# Patient Record
Sex: FEMALE | URBAN - METROPOLITAN AREA
[De-identification: names, ages, dates, MRNs, and addresses within clinical notes are randomized per-mention and may not be internally consistent; named-entity substitution may affect disease eponyms.]

---

## 2020-08-03 ENCOUNTER — EMERGENCY (EMERGENCY)
Facility: HOSPITAL | Age: 60
LOS: 1 days | Discharge: HOME | End: 2020-08-03
Attending: EMERGENCY MEDICINE | Admitting: EMERGENCY MEDICINE
Payer: MEDICARE

## 2020-08-03 VITALS
OXYGEN SATURATION: 100 % | HEART RATE: 62 BPM | SYSTOLIC BLOOD PRESSURE: 125 MMHG | TEMPERATURE: 98 F | DIASTOLIC BLOOD PRESSURE: 93 MMHG | RESPIRATION RATE: 18 BRPM

## 2020-08-03 LAB
ALBUMIN SERPL ELPH-MCNC: 4.2 G/DL — SIGNIFICANT CHANGE UP (ref 3.3–5)
ALP SERPL-CCNC: 66 U/L — SIGNIFICANT CHANGE UP (ref 40–120)
ALT FLD-CCNC: 18 U/L — SIGNIFICANT CHANGE UP (ref 4–33)
ANION GAP SERPL CALC-SCNC: 12 MMO/L — SIGNIFICANT CHANGE UP (ref 7–14)
AST SERPL-CCNC: 15 U/L — SIGNIFICANT CHANGE UP (ref 4–32)
BASOPHILS # BLD AUTO: 0.02 K/UL — SIGNIFICANT CHANGE UP (ref 0–0.2)
BASOPHILS NFR BLD AUTO: 0.4 % — SIGNIFICANT CHANGE UP (ref 0–2)
BILIRUB SERPL-MCNC: < 0.2 MG/DL — LOW (ref 0.2–1.2)
BUN SERPL-MCNC: 12 MG/DL — SIGNIFICANT CHANGE UP (ref 7–23)
CALCIUM SERPL-MCNC: 8.8 MG/DL — SIGNIFICANT CHANGE UP (ref 8.4–10.5)
CHLORIDE SERPL-SCNC: 105 MMOL/L — SIGNIFICANT CHANGE UP (ref 98–107)
CO2 SERPL-SCNC: 25 MMOL/L — SIGNIFICANT CHANGE UP (ref 22–31)
CREAT SERPL-MCNC: 0.7 MG/DL — SIGNIFICANT CHANGE UP (ref 0.5–1.3)
EOSINOPHIL # BLD AUTO: 0.14 K/UL — SIGNIFICANT CHANGE UP (ref 0–0.5)
EOSINOPHIL NFR BLD AUTO: 2.8 % — SIGNIFICANT CHANGE UP (ref 0–6)
GLUCOSE SERPL-MCNC: 92 MG/DL — SIGNIFICANT CHANGE UP (ref 70–99)
HCT VFR BLD CALC: 33.5 % — LOW (ref 34.5–45)
HGB BLD-MCNC: 10.7 G/DL — LOW (ref 11.5–15.5)
IMM GRANULOCYTES NFR BLD AUTO: 0.2 % — SIGNIFICANT CHANGE UP (ref 0–1.5)
LYMPHOCYTES # BLD AUTO: 1.73 K/UL — SIGNIFICANT CHANGE UP (ref 1–3.3)
LYMPHOCYTES # BLD AUTO: 34.9 % — SIGNIFICANT CHANGE UP (ref 13–44)
MCHC RBC-ENTMCNC: 29.7 PG — SIGNIFICANT CHANGE UP (ref 27–34)
MCHC RBC-ENTMCNC: 31.9 % — LOW (ref 32–36)
MCV RBC AUTO: 93.1 FL — SIGNIFICANT CHANGE UP (ref 80–100)
MONOCYTES # BLD AUTO: 0.26 K/UL — SIGNIFICANT CHANGE UP (ref 0–0.9)
MONOCYTES NFR BLD AUTO: 5.3 % — SIGNIFICANT CHANGE UP (ref 2–14)
NEUTROPHILS # BLD AUTO: 2.79 K/UL — SIGNIFICANT CHANGE UP (ref 1.8–7.4)
NEUTROPHILS NFR BLD AUTO: 56.4 % — SIGNIFICANT CHANGE UP (ref 43–77)
NRBC # FLD: 0 K/UL — SIGNIFICANT CHANGE UP (ref 0–0)
PLATELET # BLD AUTO: 195 K/UL — SIGNIFICANT CHANGE UP (ref 150–400)
PMV BLD: 12.5 FL — SIGNIFICANT CHANGE UP (ref 7–13)
POTASSIUM SERPL-MCNC: 3.6 MMOL/L — SIGNIFICANT CHANGE UP (ref 3.5–5.3)
POTASSIUM SERPL-SCNC: 3.6 MMOL/L — SIGNIFICANT CHANGE UP (ref 3.5–5.3)
PROT SERPL-MCNC: 6.6 G/DL — SIGNIFICANT CHANGE UP (ref 6–8.3)
RBC # BLD: 3.6 M/UL — LOW (ref 3.8–5.2)
RBC # FLD: 13.7 % — SIGNIFICANT CHANGE UP (ref 10.3–14.5)
SODIUM SERPL-SCNC: 142 MMOL/L — SIGNIFICANT CHANGE UP (ref 135–145)
WBC # BLD: 4.95 K/UL — SIGNIFICANT CHANGE UP (ref 3.8–10.5)
WBC # FLD AUTO: 4.95 K/UL — SIGNIFICANT CHANGE UP (ref 3.8–10.5)

## 2020-08-03 PROCEDURE — 99284 EMERGENCY DEPT VISIT MOD MDM: CPT

## 2020-08-03 PROCEDURE — 70498 CT ANGIOGRAPHY NECK: CPT | Mod: 26

## 2020-08-03 PROCEDURE — 70496 CT ANGIOGRAPHY HEAD: CPT | Mod: 26

## 2020-08-03 RX ORDER — METOCLOPRAMIDE HCL 10 MG
10 TABLET ORAL ONCE
Refills: 0 | Status: COMPLETED | OUTPATIENT
Start: 2020-08-03 | End: 2020-08-03

## 2020-08-03 RX ORDER — KETOROLAC TROMETHAMINE 30 MG/ML
15 SYRINGE (ML) INJECTION ONCE
Refills: 0 | Status: DISCONTINUED | OUTPATIENT
Start: 2020-08-03 | End: 2020-08-03

## 2020-08-03 RX ORDER — SODIUM CHLORIDE 9 MG/ML
1000 INJECTION INTRAMUSCULAR; INTRAVENOUS; SUBCUTANEOUS ONCE
Refills: 0 | Status: COMPLETED | OUTPATIENT
Start: 2020-08-03 | End: 2020-08-03

## 2020-08-03 RX ADMIN — Medication 15 MILLIGRAM(S): at 18:18

## 2020-08-03 RX ADMIN — Medication 10 MILLIGRAM(S): at 18:18

## 2020-08-03 RX ADMIN — SODIUM CHLORIDE 1000 MILLILITER(S): 9 INJECTION INTRAMUSCULAR; INTRAVENOUS; SUBCUTANEOUS at 18:18

## 2020-08-03 NOTE — ED PROVIDER NOTE - PATIENT PORTAL LINK FT
You can access the FollowMyHealth Patient Portal offered by [facility] by registering at the following website: [email or web address]. By joining [employer]’s FollowMyHealth portal, you will also be able to view your health information using other applications (apps) compatible with our system.

## 2020-08-03 NOTE — CONSULT NOTE ADULT - SUBJECTIVE AND OBJECTIVE BOX
HPI:      PAST MEDICAL & SURGICAL HISTORY:  No pertinent past medical history    FAMILY HISTORY:       General:  Constitutional: Obese Female, appears stated age, in no apparent distress including pain  Head: Normocephalic & atraumatic.  ENT: Patent ear canals, intact TM, mucus membranes moist & pink, neck supple, no lymphadenopathy.   Respiratory: Patent airway. All lung fields are clear to auscultation bilaterally.  Extremities: No cyanosis, clubbing, or edema.  Skin: No rashes, bruising, or discoloration.    Cardiovascular (>2): RRR no murmurs. Carotid pulsations symmetric, no bruits. Normal capillary beds refill, 1-2 seconds or less.     Neurological (>12):  MS: Awake, alert, oriented to person, place, situation, time. Normal affect. Follows all commands.    Language: Speech is clear, fluent with good repetition & comprehension (able to name objects___)    CNs: PERRLA (R = 3mm, L = 3mm). VFF. EOMI no nystagmus, no diplopia. V1-3 intact to LT/pinprick, well developed masseter muscles b/l. No facial asymmetry b/l, full eye closure strength b/l. Hearing grossly normal (rubbing fingers) b/l. Symmetric palate elevation in midline. Gag reflex deferred. Head turning & shoulder shrug intact b/l. Tongue midline, normal movements, no atrophy.    Fundoscopic: pale w/ sharp discs margins No vascular changes.      Motor: Normal muscle bulk & tone. No noticeable tremor or seizure. No pronator drift.              Deltoid	Biceps	Triceps	Wrist	Finger ABd	   R	5	5	5	5	5		5 	  L	5	5	5	5	5		5    	H-Flex	H-Ext	H-ABd	H-ADd	K-Flex	K-Ext	D-Flex	P-Flex  R	5	5	5	5	5	5	5	5 	   L	5	5	5	5	5	5	5	5	     Sensation: Intact to LT/PP/Temp/Vibration/Position b/l throughout.     Cortical: Extinction on DSS (neglect): none    Reflexes:              Biceps(C5)       BR(C6)     Triceps(C7)               Patellar(L4)    Achilles(S1)    Plantar Resp  R	2	          2	             2		        2		    2		Down   L	2	          2	             2		        2		    2		Down     Coordination: intact rapid-alt movements. No dysmetria to FTN/HTS    Gait: Normal Romberg. No postural instability. Normal stance and tandem gait.     LABORATORY:  CBC                       10.7   4.95  )-----------( 195      ( 03 Aug 2020 18:14 )             33.5     Chem 08-03    142  |  105  |  12  ----------------------------<  92  3.6   |  25  |  0.70    Ca    8.8      03 Aug 2020 18:14    TPro  6.6  /  Alb  4.2  /  TBili  < 0.2<L>  /  DBili  x   /  AST  15  /  ALT  18  /  AlkPhos  66  08-03    LFTs LIVER FUNCTIONS - ( 03 Aug 2020 18:14 )  Alb: 4.2 g/dL / Pro: 6.6 g/dL / ALK PHOS: 66 u/L / ALT: 18 u/L / AST: 15 u/L / GGT: x           Coagulopathy   Lipid Panel   A1c   Cardiac enzymes     U/A HPI:  Patient is a 60 year old female who presents with CC of dizziness which began on day of presentation, 8/3/20. She has PMHx of syringomyelia, ?thickened uterus (getting MRI pelvis in several days), and anemia. She woke up on AM of presentation and felt off balance. Symptoms worsened after she stood up from the toilet, and felt like she was going to fall over, and had to grab the edge of the sink and then laid down, sx lasted for several hours. She went to an urgent care who sent her to the ED for eval. She denies current dizziness but did have headache which resolved with toradol and reglan in ED.   CTh, CTA pending in ED. CBC, CMP unremarkable    General:  Constitutional: Female, appears stated age, in no apparent distress including pain  Head: Normocephalic & atraumatic.  Neurological (>12):  MS: Awake, alert, oriented to person, place, situation, time. Normal affect. Follows all commands.    Language: Speech is clear, fluent with good repetition & comprehension    CNs: PERRL. VFF. EOMI no nystagmus, no diplopia. V1-3 intact to LT No facial asymmetry b/l, full eye closure strength b/l. Hearing grossly normal (rubbing fingers) b/l. Head turning & shoulder shrug intact b/l. Tongue midline    Motor: Normal muscle bulk & tone. No noticeable tremor or seizure. No pronator drift.              Deltoid	Biceps	Triceps	   R	5	5	5	5  L	5	5	5	5	    	H-Flex	H-Ext	K-Flex	K-Ext	D-Flex	P-Flex  R	5	5	5	5	5	5	   L	5	5	5	5	5	5	     Sensation: Intact to LT b/l throughout.   Cortical: Extinction on DSS (neglect): none    Reflexes:              Biceps(C5)       BR(C6)     Triceps(C7)               Patellar(L4)    Achilles(S1)    Plantar Resp  R	2	          2	             2		        2		    2		Down   L	2	          2	             2		        2		    2		Down     Coordination: intact rapid-alt movements. No dysmetria to FTN/HTS  Gait: Normal Romberg. No postural instability. Normal stance and tandem gait.     LABORATORY:  CBC                       10.7   4.95  )-----------( 195      ( 03 Aug 2020 18:14 )             33.5     Chem 08-03    142  |  105  |  12  ----------------------------<  92  3.6   |  25  |  0.70    Ca    8.8      03 Aug 2020 18:14    TPro  6.6  /  Alb  4.2  /  TBili  < 0.2<L>  /  DBili  x   /  AST  15  /  ALT  18  /  AlkPhos  66  08-03    LFTs LIVER FUNCTIONS - ( 03 Aug 2020 18:14 )  Alb: 4.2 g/dL / Pro: 6.6 g/dL / ALK PHOS: 66 u/L / ALT: 18 u/L / AST: 15 u/L / GGT: x HPI:  Patient is a 60 year old female who presents with CC of dizziness which began on day of presentation, 8/3/20. She has PMHx of syringomyelia (does not recall name of neurologist), ?thickened uterus (getting MRI pelvis in several days), and anemia. She woke up on AM of presentation and felt off balance. Symptoms worsened after she stood up from the toilet, and felt like she was going to fall over, and had to grab the edge of the sink and then laid down, sx lasted for several hours. She described the dizziness as room spinning when she laid down, and as if she were "on a boat" while laying down. She went to an urgent care who sent her to the ED for eval. She denies current dizziness but did have headache which has mostly resolved with toradol and reglan in ED. This headache is unchanged from her prior headaches, occurs usually above her R eyebrow and in the middle of her forehead. She used to take percocet and ibuprofen for HA, however stopped ibuprofen due to LGIB.   CTh, CTA in ED unremarkable. CBC, CMP unremarkable  Neurology was consulted for dysmetria to FTN on LUE on exam.     General:  Constitutional: Female, appears stated age, in no apparent distress including pain  Head: Normocephalic & atraumatic.  Neurological (>12):  MS: Awake, alert, oriented to person, place, situation, time. Normal affect. Follows all commands.  Language: Speech is clear, fluent with good repetition & comprehension  CNs: PERRL. VFF. EOMI no nystagmus, no diplopia. V1-3 intact to LT No facial asymmetry b/l, full eye closure strength b/l. Hearing grossly normal (rubbing fingers) b/l. Head turning & shoulder shrug intact b/l. Tongue midline  Motor: Normal muscle bulk & tone. No noticeable tremor or seizure. No pronator drift.              Deltoid	Biceps	Triceps	   R	5	5	5	5  L	5	5	5	5	    	H-Flex	H-Ext	K-Flex	K-Ext	D-Flex	P-Flex  R	5	5	5	5	5	5	   L	5	5	5	5	5	5	     Sensation: Intact to LT on RUE, RLE, slightly diminished on LLE, LUE, however face is spared.   Cortical: Extinction on DSS (neglect): none  Coordination: intact rapid-alt movements. No dysmetria to FTN  Gait: Normal Romberg. No postural instability. Normal stance and gait.     LABORATORY:  CBC                       10.7   4.95  )-----------( 195      ( 03 Aug 2020 18:14 )             33.5     Chem 08-03    142  |  105  |  12  ----------------------------<  92  3.6   |  25  |  0.70    Ca    8.8      03 Aug 2020 18:14    TPro  6.6  /  Alb  4.2  /  TBili  < 0.2<L>  /  DBili  x   /  AST  15  /  ALT  18  /  AlkPhos  66  08-03    LFTs LIVER FUNCTIONS - ( 03 Aug 2020 18:14 )  Alb: 4.2 g/dL / Pro: 6.6 g/dL / ALK PHOS: 66 u/L / ALT: 18 u/L / AST: 15 u/L / GGT: x

## 2020-08-03 NOTE — ED PROVIDER NOTE - CLINICAL SUMMARY MEDICAL DECISION MAKING FREE TEXT BOX
59 y/o female c/o dizziness after standing from toilet bowl- low concern for posterior CVA as symptoms have resolved spontaneously. will check labs, ekg, CT head reassess

## 2020-08-03 NOTE — ED ADULT TRIAGE NOTE - CHIEF COMPLAINT QUOTE
states" I feel dizzy since last night weakness. " was having head ache since yesterday. h/o pelvic bacterial infection 10 days ago and treated with antibiotics and still has pelvic pain".

## 2020-08-03 NOTE — ED ADULT NURSE REASSESSMENT NOTE - NS ED NURSE REASSESS COMMENT FT1
pt resting in results waiting. Pt alert and oriented x4. c.o  7/10 pelvic pain, escalated to md for pain medication. pt waiting for reassessment

## 2020-08-03 NOTE — ED PROVIDER NOTE - ATTENDING CONTRIBUTION TO CARE
agree with PA note    "61 y/o female c/o dizziness x today. Pt admits to waking up this AM and felling off balance. Pt states symptoms started immediately after standing up form the toilet. Pt felt like she was going to fall over, grabbed the edge of the sink and stabilized, then laid back down in bed. Pt states that symptoms lasted several hours and she went to urgent care who then sent pt to ER chidi schroeder. Pt now feeling better, denies current dizziness but admits to generalized headache. Pt also admits to b/l lower abd pain x several months."  States US shows thickened endometrium.  Scheduled for pelvic MRI this Wednesday.  States awoke with dizziness.  not described as vertiginous.    PE: well appearing; VSS; not clinically orthostatic; EOMI; CTAB/L; s1 s2 no m/r/g abd soft/mild lower tenderness ext: no edema Neuro: CNs intact; 5/5 motor UE and LE; SUNIL wnl; FTN abnormal on left; gait stable    pt has hx of anemia, syringomyelia (no neuro symptoms); will check labs, IVF, given abnormal FTN will have neuro consult (finger goes straight and is linear but misses my finger), CTA head and neck

## 2020-08-03 NOTE — ED ADULT NURSE NOTE - NSIMPLEMENTINTERV_GEN_ALL_ED
Implemented All Universal Safety Interventions:  [place] to call system. Call bell, personal items and telephone within reach. Instruct patient to call for assistance. Room bathroom lighting operational. Non-slip footwear when patient is off stretcher. Physically safe environment: no spills, clutter or unnecessary equipment. Stretcher in lowest position, wheels locked, appropriate side rails in place.

## 2020-08-03 NOTE — CONSULT NOTE ADULT - ASSESSMENT
PENDED 60 year old female who presents with CC of dizziness which began on day of presentation, 8/3/20. She has PMHx of syringomyelia (does not recall name of neurologist), ?thickened uterus (getting MRI pelvis in several days), and anemia. She woke up on AM of presentation and felt off balance. Symptoms worsened after she stood up from the toilet, and felt like she was going to fall over, and had to grab the edge of the sink and then laid down, sx lasted for several hours. She described the dizziness as room spinning when she laid down, and as if she were "on a boat" while laying down. She went to an urgent care who sent her to the ED for eval. She denies current dizziness but did have headache which has mostly resolved with toradol and reglan in ED. This headache is unchanged from her prior headaches, occurs usually above her R eyebrow and in the middle of her forehead. She used to take percocet and ibuprofen for HA, however stopped ibuprofen due to LGIB. She denies ringing in ears, no sensation of fullness  CTh, CTA in ED unremarkable. CBC, CMP unremarkable  Neurology was consulted for dysmetria to FTN on LUE on exam.   On exam she no longer has FTN dysmetria, states that dizziness has resolved, she is able to ambulate without assistance, and her headache is resolving    Impression: likely peripheral vertigo, given negative imaging and resolution of symptoms. regarding LUE, LLE numbness, unclear chronicity of symptoms.     Plan  [] follow up with outpatient neurologist. If unable to follow up, can follow up with Dr Johann kitchen as outpt for MRI  [] continue current home medications  [] no further inpatient neurologic workup    To be d/w attending 60 year old female who presents with CC of dizziness which began on day of presentation, 8/3/20. She has PMHx of syringomyelia (does not recall name of neurologist), ?thickened uterus (getting MRI pelvis in several days), and anemia. She woke up on AM of presentation and felt off balance. Symptoms worsened after she stood up from the toilet, and felt like she was going to fall over, and had to grab the edge of the sink and then laid down, sx lasted for several hours. She described the dizziness as room spinning when she laid down, and as if she were "on a boat" while laying down. She went to an urgent care who sent her to the ED for eval. She denies current dizziness but did have headache which has mostly resolved with toradol and reglan in ED. This headache is unchanged from her prior headaches, occurs usually above her R eyebrow and in the middle of her forehead. She used to take percocet and ibuprofen for HA, however stopped ibuprofen due to LGIB. She denies ringing in ears, no sensation of fullness  CTh, CTA in ED unremarkable. CBC, CMP unremarkable  Neurology was consulted for dysmetria to FTN on LUE on exam.   On exam she no longer has FTN dysmetria, states that dizziness has resolved, she is able to ambulate without assistance, and her headache is resolving    Impression: likely pre-syncope, given negative imaging and resolution of symptoms. regarding LUE, LLE numbness, unclear chronicity of symptoms.     Plan  [] follow up with outpatient neurologist. If unable to follow up, can follow up with Dr Johann kitchen as outpt for MRI  [] continue current home medications  [] no further inpatient neurologic workup    To be d/w attending

## 2020-08-03 NOTE — ED PROVIDER NOTE - OBJECTIVE STATEMENT
59 y/o female c/o dizziness x today. Pt admits to waking up this AM and felling off balance. Pt states symptoms started immediately after standing up form the toilet. Pt felt like she was going to fall over, grabbed the edge of the sink and stabilized, then laid back down in bed. Pt states that symptoms lasted several hours and she went to urgent care who then sent pt to ER chidi schroeder. Pt now feeling better, denies current dizziness but admits to generalized headache. Pt also admits to b/l lower abd pain x several months. Pt had recent US showing "thickened uterus" and pt is scheduled for MRI in 2 days. Denies chest pain, sob, palpitations, n/v/d, dysuria, vaginal bleeding or discharge, numbness, tingling, weakness, neck pain, slurred speech, change in vision, fever or chills.

## 2020-08-03 NOTE — ED ADULT NURSE NOTE - OBJECTIVE STATEMENT
Pt A/Ox4 states she was sent in by urgent care to "rule out a stroke." Pt states she has been feeling dizzy and lightheaded and went to urgent care today and was told, "she didn't have all her faculties and didn't pass her neuro exam and needed an ambulance to take her to the ED as she was having a stroke." Pt tearful upon assessment states she feels very anxious and has a h/a +abd pain. Grzegorz AVALOS informed. PIV inserted with aseptic technique, blood drawn, labeled at bedside with 2 pt identifiers and sent to lab. Pt aware of POC, NAD. Medicated per order.

## 2020-08-03 NOTE — CONSULT NOTE ADULT - ATTENDING COMMENTS
Case discussed with resident overnight. Seems to be more likely pre-syncope, 2/2 hypovolemia. Pt can follow up in the office.

## 2020-09-16 PROBLEM — Z78.9 OTHER SPECIFIED HEALTH STATUS: Chronic | Status: ACTIVE | Noted: 2020-08-03

## 2022-05-02 NOTE — ED PROVIDER NOTE - CROS ED NEURO ALL NEG
Medication:    Outpatient Medications Marked as Taking for the 5/2/22 encounter (Refill) with JOSE RAMON Carson   Medication Sig Dispense Refill   • traZODone (DESYREL) 50 MG tablet Take one and one half tablets nightly 135 tablet 1   • desvenlafaxine (PRISTIQ) 100 MG 24 hr tablet Take 100 mg by mouth daily. 90 tablet 1   • lamoTRIgine (LaMICtal) 150 MG tablet Take one tablet daily in the evening 90 tablet 1   • prazosin (MINIPRESS) 2 MG capsule Take 1 capsule by mouth nightly. 30 capsule 3       Message to Prescriber: New pharmacy requesting    [x] Pharmacy has been verified.    [] Patient completely out of medication (*Route encounter as high priority if checked)    [] Patient informed refill request can take up to 5 business days to be processed    Patient currently has follow up appointment scheduled       - - -

## 2022-07-15 PROBLEM — Z00.00 ENCOUNTER FOR PREVENTIVE HEALTH EXAMINATION: Status: ACTIVE | Noted: 2022-07-15

## 2022-07-19 ENCOUNTER — APPOINTMENT (OUTPATIENT)
Dept: NEUROSURGERY | Facility: CLINIC | Age: 62
End: 2022-07-19

## 2022-08-08 ENCOUNTER — APPOINTMENT (OUTPATIENT)
Dept: NEUROSURGERY | Facility: CLINIC | Age: 62
End: 2022-08-08

## 2022-08-12 NOTE — HISTORY OF PRESENT ILLNESS
[de-identified] : Patient is a 62 year old female with history of headache and neck pain, left arm pain. Imaging studies at [facility] on 01/10/22.\par \par MRI brain w/o 1/10/22: No intracranial abnormality, stable microvascular disease\par MRI cervical spine w/o 1/10/22: Multilevel cervical spondylosis with mild to moderate canal and foraminal stenosis. Upper spinal cord syrinx extending from C1-C3. \par \par \par ****INCOMPLETE**** 8/12/22

## 2022-08-12 NOTE — ASSESSMENT
[FreeTextEntry1] : IMPRESSION:\par Patient is a 62 year old female with history of headache and neck pain, left arm pain. Imaging studies at [facility] on 01/10/22.\par \par MRI brain w/o 1/10/22: No intracranial abnormality, stable microvascular disease\par MRI cervical spine w/o 1/10/22: Multilevel cervical spondylosis with mild to moderate canal and foraminal stenosis. Upper spinal cord syrinx extending from C1-C3. \par \par ****INCOMPLETE**** 8/12/22\par \par \par \par \par PLAN:\par

## 2022-08-15 ENCOUNTER — APPOINTMENT (OUTPATIENT)
Dept: NEUROSURGERY | Facility: CLINIC | Age: 62
End: 2022-08-15

## 2022-08-19 ENCOUNTER — APPOINTMENT (OUTPATIENT)
Dept: NEUROSURGERY | Facility: CLINIC | Age: 62
End: 2022-08-19

## 2022-08-19 VITALS
OXYGEN SATURATION: 97 % | HEIGHT: 65 IN | BODY MASS INDEX: 28.32 KG/M2 | SYSTOLIC BLOOD PRESSURE: 117 MMHG | WEIGHT: 170 LBS | DIASTOLIC BLOOD PRESSURE: 72 MMHG | HEART RATE: 75 BPM | TEMPERATURE: 97.1 F

## 2022-08-19 DIAGNOSIS — Z78.9 OTHER SPECIFIED HEALTH STATUS: ICD-10-CM

## 2022-08-19 PROCEDURE — 99204 OFFICE O/P NEW MOD 45 MIN: CPT

## 2022-08-19 RX ORDER — IBUPROFEN 200 MG/1
TABLET, FILM COATED ORAL
Refills: 0 | Status: ACTIVE | COMMUNITY

## 2022-08-25 ENCOUNTER — APPOINTMENT (OUTPATIENT)
Dept: NEUROSURGERY | Facility: CLINIC | Age: 62
End: 2022-08-25

## 2022-08-27 NOTE — ASSESSMENT
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 Sanchez, personally performed the evaluation and management (E/M) services for this new patient.  That E/M includes conducting the initial examination, assessing all conditions, and establishing the plan of care.  Today, my ACP, Syeda Lepe, was here to observe my evaluation and management services for this patient to be followed going forward.\par \par

## 2022-08-27 NOTE — PHYSICAL EXAM
[General Appearance - Alert] : alert [General Appearance - In No Acute Distress] : in no acute distress [Oriented To Time, Place, And Person] : oriented to person, place, and time [Limited Balance] : the patient's balance was impaired [No Visual Abnormalities] : no visible abnormalities [Sclera] : the sclera and conjunctiva were normal [Outer Ear] : the ears and nose were normal in appearance [Neck Appearance] : the appearance of the neck was normal [] : no respiratory distress [Skin Color & Pigmentation] : normal skin color and pigmentation [FreeTextEntry6] : RIGHT arm and LEFT arm strength 4/5\par

## 2022-08-27 NOTE — HISTORY OF PRESENT ILLNESS
[de-identified] : \par 62 year old woman with no past medical history who presents today for neurosurgical evaluation of syrinx in the cervical spinal cord.\par \par She states she has known about syrinx for the past 20 years. It was initially found as an incidental finding when she had a lipoma on her neck removed. She states that over the past 20 years, she has experienced worsening radiating pain/numbness/tingling in bilateral upper extremities as well as imbalance. She uses a cane for assistance with walking. She also reports subjective weakness in bilateral hands and arms.\par \par MRI brain without contrast done on 1/10/22 demonstrates upper cervical spinal cord syrinx and right frontal scalp subgaleal lipoma.\par \par MRI cervical spine without contrast done on 1/10/22 demonstrates a decrease in the size of the syrinx in the upper cervical cord when compared to prior examination.

## 2022-08-27 NOTE — DATA REVIEWED
[de-identified] : MRI brain without contrast from 1/10/22 and MRI cervical spine without contrast from 1/10/22 reviewed

## 2023-03-27 ENCOUNTER — APPOINTMENT (OUTPATIENT)
Dept: NEUROSURGERY | Facility: CLINIC | Age: 63
End: 2023-03-27

## 2023-03-29 ENCOUNTER — APPOINTMENT (OUTPATIENT)
Dept: NEUROSURGERY | Facility: HOSPITAL | Age: 63
End: 2023-03-29

## 2023-03-30 ENCOUNTER — APPOINTMENT (OUTPATIENT)
Dept: NEUROSURGERY | Facility: CLINIC | Age: 63
End: 2023-03-30
Payer: MEDICARE

## 2023-03-30 VITALS
SYSTOLIC BLOOD PRESSURE: 120 MMHG | DIASTOLIC BLOOD PRESSURE: 78 MMHG | HEIGHT: 65 IN | TEMPERATURE: 95.5 F | OXYGEN SATURATION: 97 % | WEIGHT: 155 LBS | BODY MASS INDEX: 25.83 KG/M2 | RESPIRATION RATE: 17 BRPM | HEART RATE: 80 BPM

## 2023-03-30 DIAGNOSIS — G93.5 COMPRESSION OF BRAIN: ICD-10-CM

## 2023-03-30 DIAGNOSIS — G95.0 SYRINGOMYELIA AND SYRINGOBULBIA: ICD-10-CM

## 2023-03-30 PROCEDURE — 99215 OFFICE O/P EST HI 40 MIN: CPT

## 2023-03-30 NOTE — PHYSICAL EXAM
[General Appearance - Alert] : alert [General Appearance - In No Acute Distress] : in no acute distress [Oriented To Time, Place, And Person] : oriented to person, place, and time [Impaired Insight] : insight and judgment were intact [Affect] : the affect was normal [Cranial Nerves Optic (II)] : visual acuity intact bilaterally,  pupils equal round and reactive to light [Cranial Nerves Oculomotor (III)] : extraocular motion intact [Cranial Nerves Facial (VII)] : face symmetrical [Cranial Nerves Vestibulocochlear (VIII)] : hearing was intact bilaterally [Cranial Nerves Glossopharyngeal (IX)] : tongue and palate midline [Cranial Nerves Accessory (XI - Cranial And Spinal)] : head turning and shoulder shrug symmetric [Cranial Nerves Hypoglossal (XII)] : there was no tongue deviation with protrusion [Limited Balance] : the patient's balance was impaired [Neck Appearance] : the appearance of the neck was normal [] : no respiratory distress [Respiration, Rhythm And Depth] : normal respiratory rhythm and effort [FreeTextEntry5] : b/l arms 4/5 in strength, 4/5 b/l hand grasp [FreeTextEntry8] : proprioception limited at times when ambulating

## 2023-03-30 NOTE — DATA REVIEWED
[de-identified] : 3/28 MRI brain CSF flow study and MRI cervical from [facility]  independently reviewed by Dr. 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

## 2023-03-30 NOTE — ADDENDUM
[FreeTextEntry1] : Patient is myelopathic with loss of lower extremity proprioception, numbness in arms and legs, subjective weakness, loss of balance. New MRI brain and C-spine showing an approximately 6 mm Chiari malformation associated with cervical spinal cord syrinx. Her myelopathic symptoms are likely due in part to the syringomyelia which does result in neural element compression in the cervical spinal cord. I discussed with the patient that suboccipital decompression-C1 laminectomy is one way to address the radiographic findings in hopes of preventing progression of neurological symptoms. Risks including but not limited to CSF leak, infection, need for re-operation, failure to improve neurologically, worsening neurological function, death, anesthetic complication discussed. Patient understands and wishes to move forward with surgery.\par \par Bro Sanchez M.D.

## 2023-03-30 NOTE — REASON FOR VISIT
[FreeTextEntry1] : to review MRI brain CSF flow study and MRI cervical for hx cerebellar ectopia and cervical cord syrinx.

## 2023-03-30 NOTE — HISTORY OF PRESENT ILLNESS
[FreeTextEntry1] : 62 year old woman with no past medical history who presented in Aug 2022 for neurosurgical evaluation of syrinx in the cervical spinal cord.\par \par She states she has known about syrinx for the past 20 years. It was initially found as an incidental finding when she had a lipoma on her neck removed. She states that over the past 20 years, she has experienced worsening radiating pain/numbness/tingling in bilateral upper extremities as well as imbalance. She uses a cane for assistance with walking. She also reports subjective weakness in bilateral hands and arms.\par \par MRI brain without contrast done on 1/10/22 demonstrates upper cervical spinal cord syrinx and right frontal scalp subgaleal lipoma. MRI cervical spine without contrast done on 1/10/22 demonstrates a decrease in the size of the syrinx in the upper cervical cord when compared to prior examination. She was advised to get MRI brain CSF flow study to assess for obstruction.\par \par Today, she presents back with a new MRI brain and cervical spine done on 3/28/23. \par She reports worsening R leg numbness and of b/l hands, worsening imbalance. She reports "taking steps when there are no steps". She is only able to tolerate short periods of sitting and standing. \par  \par

## 2023-03-30 NOTE — ASSESSMENT
[FreeTextEntry1] : 61 y/o female with worsening b/l hand and R leg myelopathy and limited proprioception\par  \par MRI brain CSF flow study and MRI cervical done on 3/28/23 from [facility]  independently reviewed by Dr. Sanchez with the patient that show a 6 mm Chiari Malformation with associated spinal cord syrinx.\par \par Due to worsening symptoms, Chiari decompression was recommended. Risks of surgery, including chance of infection, CSF leak and death or morbidity were discussed with the patient. Benefit of surgery to prevent progression but not reversal of damage already present were also discussed with the patient. \par \par 2-3 day hospital stay and ~1 month of no strenuous activity and recovery also discussed.\par \par Multiple questions answered to patient's satisfaction. She agrees with proposed plan of Chiari Decompression scheduled for 4/11/23. She is to obtain medical and cardiac clearance, and COVID PCR swab 3-5 days before surgery. Packet given to the patient.\par \par \par \par

## 2023-04-03 ENCOUNTER — APPOINTMENT (OUTPATIENT)
Dept: NEUROSURGERY | Facility: CLINIC | Age: 63
End: 2023-04-03

## 2023-04-07 ENCOUNTER — APPOINTMENT (OUTPATIENT)
Dept: INTERNAL MEDICINE | Facility: CLINIC | Age: 63
End: 2023-04-07

## 2023-04-10 ENCOUNTER — NON-APPOINTMENT (OUTPATIENT)
Age: 63
End: 2023-04-10

## 2023-04-11 ENCOUNTER — APPOINTMENT (OUTPATIENT)
Dept: NEUROSURGERY | Facility: CLINIC | Age: 63
End: 2023-04-11
